# Patient Record
Sex: FEMALE | Race: OTHER | HISPANIC OR LATINO | URBAN - METROPOLITAN AREA
[De-identification: names, ages, dates, MRNs, and addresses within clinical notes are randomized per-mention and may not be internally consistent; named-entity substitution may affect disease eponyms.]

---

## 2022-07-09 ENCOUNTER — EMERGENCY (EMERGENCY)
Facility: HOSPITAL | Age: 57
LOS: 1 days | Discharge: HOME | End: 2022-07-09
Attending: EMERGENCY MEDICINE
Payer: MEDICAID

## 2022-07-09 VITALS
OXYGEN SATURATION: 97 % | WEIGHT: 130.07 LBS | SYSTOLIC BLOOD PRESSURE: 169 MMHG | HEART RATE: 110 BPM | DIASTOLIC BLOOD PRESSURE: 78 MMHG | RESPIRATION RATE: 18 BRPM | HEIGHT: 64 IN | TEMPERATURE: 100 F

## 2022-07-09 VITALS
SYSTOLIC BLOOD PRESSURE: 158 MMHG | HEART RATE: 99 BPM | DIASTOLIC BLOOD PRESSURE: 77 MMHG | OXYGEN SATURATION: 99 % | RESPIRATION RATE: 18 BRPM | TEMPERATURE: 99 F

## 2022-07-09 PROCEDURE — U0005: CPT

## 2022-07-09 PROCEDURE — 99285 EMERGENCY DEPT VISIT HI MDM: CPT

## 2022-07-09 PROCEDURE — 99284 EMERGENCY DEPT VISIT MOD MDM: CPT

## 2022-07-09 PROCEDURE — U0003: CPT

## 2022-07-09 RX ORDER — IBUPROFEN 200 MG
600 TABLET ORAL ONCE
Refills: 0 | Status: COMPLETED | OUTPATIENT
Start: 2022-07-09 | End: 2022-07-09

## 2022-07-09 RX ORDER — LIDOCAINE 4 G/100G
10 CREAM TOPICAL ONCE
Refills: 0 | Status: COMPLETED | OUTPATIENT
Start: 2022-07-09 | End: 2022-07-09

## 2022-07-09 RX ORDER — ACETAMINOPHEN 500 MG
975 TABLET ORAL ONCE
Refills: 0 | Status: COMPLETED | OUTPATIENT
Start: 2022-07-09 | End: 2022-07-09

## 2022-07-09 RX ADMIN — Medication 975 MILLIGRAM(S): at 21:26

## 2022-07-09 RX ADMIN — Medication 600 MILLIGRAM(S): at 21:34

## 2022-07-09 RX ADMIN — Medication 600 MILLIGRAM(S): at 21:27

## 2022-07-09 RX ADMIN — LIDOCAINE 10 MILLILITER(S): 4 CREAM TOPICAL at 21:26

## 2022-07-09 RX ADMIN — Medication 975 MILLIGRAM(S): at 21:34

## 2022-07-09 NOTE — ED ADULT TRIAGE NOTE - CHIEF COMPLAINT QUOTE
tested positive for covid on home test on Thursday, tested negative on rapid for flu/covid at urgent care yesterday, but awaiting PCR results.  pt coming in for sore throat, body aches, cough since Thursday. sore throat worsening today and pt c/o difficulty swallowing related to pain

## 2022-07-09 NOTE — ED PROVIDER NOTE - CLINICAL SUMMARY MEDICAL DECISION MAKING FREE TEXT BOX
Checo Santiago (MD): 57y F w/ URI symptoms and possible covid infection. Pt is very well appearing. Will recommend supportive care and symptomatic treatment.

## 2022-07-09 NOTE — ED ADULT NURSE NOTE - OBJECTIVE STATEMENT
pt was seen in [facility] and tested neg for covid  tested positive at home test.  here for sore throat and aches  pt can speak and control her own secretions

## 2022-07-09 NOTE — ED PROVIDER NOTE - PATIENT PORTAL LINK FT
You can access the FollowMyHealth Patient Portal offered by [facility] by registering at the following website: [email or web address]. By joining [facility]’s FollowMyHealth portal, you will also be able to view your health information using other applications (apps) compatible with our system.

## 2022-07-09 NOTE — ED PROVIDER NOTE - OBJECTIVE STATEMENT
57y F that mainly speaks [place] ( [place] ID: [id number]) w/ no pertinent PMHx presents to the ED c/o sore throat, chills, body aches, cough since Thursday and worsening throat pain a/w difficulty swallowing. Pt states she tested positive for covid on home test twice on Thursday, tested negative on rapid for flu/covid at urgent care yesterday, but awaiting PCR results. Decided to come to the ED due to worsening throat pain. Denies smoking. Denies fever, chest pain, SOB, abd pain, dysuria, hematuria, urinary/bowel incontinence. NKDA.

## 2022-07-09 NOTE — ED PROVIDER NOTE - PHYSICAL EXAMINATION
[facility] PGY2  General: non-toxic appearing, in no respiratory distress  HEENT: atraumatic, normocephalic; pupils are equal, round and react to light, extraocular movements intact bilaterally without deficits, no conjunctival pallor, mucous membranes moist, oral pharynx no erythema/swelling   Neck: no jugular venous distension, full range of motion  Chest/Lung: clear to auscultation bilaterally, no wheezes/rhonchi/rales  Heart: regular rate and rhythm, no murmur/gallops/rubs  Abdomen: normal bowel sounds, soft, non-tender, non-distended  Extremities: no lower extremity edema, +2 radial pulses bilaterally, +2 dorsalis pedis pulses bilaterally  Musculoskeletal: full range of motion of all 4 extremities  Nervous System: alert and oriented, no motor deficits or sensory deficits; CNII-XII grossly intact; no focal neurologic deficits  Skin: no rashes/lacerations noted

## 2022-07-09 NOTE — ED PROVIDER NOTE - NSFOLLOWUPINSTRUCTIONS_ED_ALL_ED_FT
You were seen in the emergency department for sore throat. You probably have a viral infection. We also swabbed you for COVID. The result will be emailed to you. You were given motrin, tylenol, viscous lidocaine.     For pain, you may take Tylenol (acetaminophen) and/or ibuprofen (advil or motrin). Please follow the instructions on the label/container. For sore throat, honey with hot tea will also make your throat feel better.     Please follow up with your primary care doctor within a week continuation of care.     Return to the emergency department if you experience any new/concerning/worsening symptoms such as but not limited to: fever (>100.3F), intractable nausea, vomiting, chest pain, shortness of breath.

## 2022-07-09 NOTE — ED PROVIDER NOTE - NS_ ATTENDINGSCRIBEDETAILS _ED_A_ED_FT
The scribe's documentation has been prepared under my direction and personally reviewed by me in its entirety. I confirm that the note above accurately reflects all work, treatment, procedures, and medical decision making performed by me (Dr. Santiago).

## 2022-07-10 LAB — SARS-COV-2 RNA SPEC QL NAA+PROBE: DETECTED
